# Patient Record
(demographics unavailable — no encounter records)

---

## 2025-07-08 NOTE — ASSESSMENT
[FreeTextEntry1] : Mr. Hood is a 78-year-old man with history of CAD s/p elective PCI in 2000 ([facility]), dyslipidemia, IBS, and hypothyroidism presenting to establish care with a new primary cardiologist.  Impression: (1) CAD s/p elective PCI in 2000 and a second PCI performed a few months later (?ISR), asymptomatic, stable (2) Dyslipidemia, LDL 53 (3) IBS  Plan: - Obtain prior records, if possible - may have last been seen >10 years ago - Check baseline TTE - Check BIN/PVR and carotids - Continue aspirin lifelong - Switch to high intensity statin (was on simvastatin 80mg)  RTC after the above testing

## 2025-07-08 NOTE — ASSESSMENT
[FreeTextEntry1] : Mr. Hood is a 78-year-old man with history of CAD s/p elective PCI in 2000 ([facility]), dyslipidemia, IBS, and hypothyroidism presenting to establish care with a new primary cardiologist.  Impression: (1) CAD s/p elective PCI in 2000 and a second PCI performed a few months later (?ISR), asymptomatic, stable (2) Dyslipidemia, LDL 53 (3) IBS  Plan: - Obtain prior records, if possible - may have last been seen >10 years ago - Check baseline TTE - Check BIN/PVR and carotids - Continue aspirin lifelong - Switch to high intensity statin (was on simvastatin 80mg)  RTC after the above testing

## 2025-07-08 NOTE — HISTORY OF PRESENT ILLNESS
[FreeTextEntry1] : Mr. Hood is a 78-year-old man with history of CAD s/p elective PCI in 2000 ([facility]), dyslipidemia, IBD, and hypothyroidism presenting to establish care with a new primary cardiologist.  [facility] 2000 had elective PCI and 3 months later had another [facility] due to persistent symptoms and recalls having another PCI performed.  Overall reports feeling well, denies active or recent chest pain or dyspnea.  ECG shows NSR, septal infarct  Meds: - ASA 81mg - Simvastatin 80mg - Lovaza - Synthroid 112mcg - Tamsulosin - Mesalamine - Balsalazide